# Patient Record
Sex: FEMALE | Race: OTHER | HISPANIC OR LATINO | URBAN - METROPOLITAN AREA
[De-identification: names, ages, dates, MRNs, and addresses within clinical notes are randomized per-mention and may not be internally consistent; named-entity substitution may affect disease eponyms.]

---

## 2019-02-06 ENCOUNTER — EMERGENCY (EMERGENCY)
Facility: HOSPITAL | Age: 29
LOS: 1 days | Discharge: HOME | End: 2019-02-06
Admitting: EMERGENCY MEDICINE
Payer: COMMERCIAL

## 2019-02-06 VITALS
HEART RATE: 92 BPM | OXYGEN SATURATION: 98 % | RESPIRATION RATE: 18 BRPM | WEIGHT: 119.93 LBS | SYSTOLIC BLOOD PRESSURE: 108 MMHG | DIASTOLIC BLOOD PRESSURE: 73 MMHG | TEMPERATURE: 99 F

## 2019-02-06 VITALS
SYSTOLIC BLOOD PRESSURE: 91 MMHG | RESPIRATION RATE: 18 BRPM | HEART RATE: 72 BPM | OXYGEN SATURATION: 98 % | DIASTOLIC BLOOD PRESSURE: 62 MMHG

## 2019-02-06 DIAGNOSIS — R00.2 PALPITATIONS: ICD-10-CM

## 2019-02-06 DIAGNOSIS — F41.9 ANXIETY DISORDER, UNSPECIFIED: ICD-10-CM

## 2019-02-06 DIAGNOSIS — R07.89 OTHER CHEST PAIN: ICD-10-CM

## 2019-02-06 DIAGNOSIS — R06.02 SHORTNESS OF BREATH: ICD-10-CM

## 2019-02-06 LAB — HCG UR QL: NEGATIVE — SIGNIFICANT CHANGE UP

## 2019-02-06 PROCEDURE — 93010 ELECTROCARDIOGRAM REPORT: CPT

## 2019-02-06 PROCEDURE — 99284 EMERGENCY DEPT VISIT MOD MDM: CPT | Mod: 25

## 2019-02-06 RX ORDER — ALPRAZOLAM 0.25 MG
0.25 TABLET ORAL ONCE
Qty: 0 | Refills: 0 | Status: DISCONTINUED | OUTPATIENT
Start: 2019-02-06 | End: 2019-02-06

## 2019-02-06 RX ADMIN — Medication 0.25 MILLIGRAM(S): at 20:26

## 2019-02-06 NOTE — ED ADULT TRIAGE NOTE - CHIEF COMPLAINT QUOTE
c/o chest discomfort described as tightness and SOB since 430pm. h/o RA, anemia. denies N/V, HA, OCP, smoking.

## 2019-02-06 NOTE — ED PROVIDER NOTE - MEDICAL DECISION MAKING DETAILS
29 y/o female with PMHx of anxiety presents with chest discomfort and anxiety-associated symptoms. Patient now appears more comfortable, EKG is NSR and non-ischemic. Patient is afebrile, VSS, exam unremarkable other than patient appearing tearful 2/2 stress at work. Will provide 0.25 Xanax PO here and have patient f/u both with PCP and cardiologist this week for re-evaluation. Strict return precautions reviewed with patient.

## 2019-02-06 NOTE — ED PROVIDER NOTE - CARE PROVIDER_API CALL
Cathleen Junior (DO)  [place] [address]  Phone: [phone]  Fax: [phone]  Follow Up Time: 1-3 Days    Oseas Alexis)  Cardiovascular Disease; Internal Medicine  [address] [place] [address], [address] [place] [address]  Phone: [phone]  Fax: [phone]  Follow Up Time: 1-3 Days

## 2019-02-06 NOTE — ED PROVIDER NOTE - NSFOLLOWUPINSTRUCTIONS_ED_ALL_ED_FT
PLEASE FOLLOW-UP WITH DR. LONG (CARDIOLOGIST) IN 1-2 DAYS FOR FURTHER EVALUATION.  PLEASE TAKE ALL PAPERWORK FROM TODAY'S VISIT TO YOUR APPOINTMENT.    PLEASE FOLLOW UP WITH DR. WARE LISTED HERE WITHIN 1-2 DAYS AS DISCUSSED.    PLEASE RETURN TO THE ER IMMEDIATELY OR CALL 911 FOR ANY HIGH FEVER, CHEST PAIN, TROUBLE BREATHING, VOMITING, SEVERE PAIN, OR ANY OTHER CONCERNS

## 2019-02-06 NOTE — ED PROVIDER NOTE - PROVIDER TOKENS
PROVIDER:[TOKEN:[[id number]],FOLLOWUP:[1-3 Days]],PROVIDER:[TOKEN:[[id number]],FOLLOWUP:[1-3 Days]]

## 2019-02-06 NOTE — ED PROVIDER NOTE - CHPI ED SYMPTOMS NEG
no fever, chills, nausea, vomiting, diarrhea, numbness, tingling, cough, congestion, calf pain or swelling

## 2019-02-06 NOTE — ED ADULT NURSE NOTE - OBJECTIVE STATEMENT
Pt presents to ED c/o sudden onset nonradiating chest tightness with warmth, SOB and palpitations at 1630 today. Pt reports hx of anxiety and depression, used to take Xanax. Pt states she had multiple stressful meetings at work prior to episode. Denies any SI/HI, no auditory/visual hallucinations. Reports most of symptoms have subsided in ED, feels slightly anxious currently.

## 2019-02-06 NOTE — ED PROVIDER NOTE - CARE PROVIDERS DIRECT ADDRESSES
[email or web address]

## 2019-02-06 NOTE — ED PROVIDER NOTE - OBJECTIVE STATEMENT
29 y/o female with PMHx of RA, iron-deficiency anemia, migraines, anxiety (used to be on Xanax), depression, ?asthma (Rxed rescue inhaler in the past) presents to ED c/o chest discomfort and difficulty breathing x 2 hours. Patient reports her heart began racing earlier today, and she felt chest tightness and warmth. Patient describes chest discomfort as intermittent non-radiating, 8/10 at its worst but now subsided to 6/10. Endorses feeling stressed/anxious due to situation at work where she is under scrutiny, and reports symptoms began after she was in meetings all day under scrutiny. Denies fever, chills, nausea, vomiting, diarrhea, numbness, tingling, cough, congestion, calf pain or swelling, recent travel, recent sick contacts, or BCP use. Also denies any exertional symptoms, with no aggravating/alleviating factors (no pain meds taken, not worse with deep inspiration or positional change).

## 2019-02-06 NOTE — ED PROVIDER NOTE - MUSCULOSKELETAL, MLM
Spine appears normal, range of motion is not limited, no muscle or joint tenderness. No calf edema bilaterally.

## 2019-02-06 NOTE — ED PROVIDER NOTE - CONSTITUTIONAL, MLM
normal... Well appearing, well nourished, awake, alert, oriented to person, place, time/situation. Patient is slightly tearful.

## 2022-04-14 NOTE — ED ADULT NURSE NOTE - NSIMPLEMENTINTERV_GEN_ALL_ED
soft/nontender/bowel sounds normal Implemented All Universal Safety Interventions:  [place] to call system. Call bell, personal items and telephone within reach. Instruct patient to call for assistance. Room bathroom lighting operational. Non-slip footwear when patient is off stretcher. Physically safe environment: no spills, clutter or unnecessary equipment. Stretcher in lowest position, wheels locked, appropriate side rails in place.